# Patient Record
Sex: FEMALE | Race: BLACK OR AFRICAN AMERICAN | Employment: FULL TIME | URBAN - METROPOLITAN AREA
[De-identification: names, ages, dates, MRNs, and addresses within clinical notes are randomized per-mention and may not be internally consistent; named-entity substitution may affect disease eponyms.]

---

## 2018-08-31 ENCOUNTER — OCC HEALTH (OUTPATIENT)
Dept: OCCUPATIONAL MEDICINE | Age: 33
End: 2018-08-31
Attending: PHYSICIAN ASSISTANT

## 2018-09-04 ENCOUNTER — OCC HEALTH (OUTPATIENT)
Dept: OCCUPATIONAL MEDICINE | Age: 33
End: 2018-09-04
Attending: FAMILY MEDICINE

## 2018-09-06 ENCOUNTER — OFFICE VISIT (OUTPATIENT)
Dept: OCCUPATIONAL MEDICINE | Age: 33
End: 2018-09-06
Attending: PHYSICIAN ASSISTANT

## 2018-09-11 ENCOUNTER — OFFICE VISIT (OUTPATIENT)
Dept: OCCUPATIONAL MEDICINE | Age: 33
End: 2018-09-11
Attending: PHYSICIAN ASSISTANT

## 2018-09-13 ENCOUNTER — OFFICE VISIT (OUTPATIENT)
Dept: OCCUPATIONAL MEDICINE | Age: 33
End: 2018-09-13
Attending: PHYSICIAN ASSISTANT

## 2021-07-22 ENCOUNTER — LAB ENCOUNTER (OUTPATIENT)
Dept: LAB | Age: 36
End: 2021-07-22
Attending: INTERNAL MEDICINE
Payer: MEDICAID

## 2021-07-22 ENCOUNTER — TELEPHONE (OUTPATIENT)
Dept: INTERNAL MEDICINE CLINIC | Facility: CLINIC | Age: 36
End: 2021-07-22

## 2021-07-22 ENCOUNTER — OFFICE VISIT (OUTPATIENT)
Dept: INTERNAL MEDICINE CLINIC | Facility: CLINIC | Age: 36
End: 2021-07-22
Payer: MEDICAID

## 2021-07-22 ENCOUNTER — PATIENT MESSAGE (OUTPATIENT)
Dept: INTERNAL MEDICINE CLINIC | Facility: CLINIC | Age: 36
End: 2021-07-22

## 2021-07-22 VITALS
WEIGHT: 247 LBS | OXYGEN SATURATION: 98 % | HEART RATE: 86 BPM | TEMPERATURE: 99 F | SYSTOLIC BLOOD PRESSURE: 146 MMHG | BODY MASS INDEX: 41.15 KG/M2 | HEIGHT: 65 IN | RESPIRATION RATE: 16 BRPM | DIASTOLIC BLOOD PRESSURE: 96 MMHG

## 2021-07-22 DIAGNOSIS — Z00.00 LABORATORY EXAM ORDERED AS PART OF ROUTINE GENERAL MEDICAL EXAMINATION: ICD-10-CM

## 2021-07-22 DIAGNOSIS — Z86.018 HISTORY OF UTERINE FIBROID: ICD-10-CM

## 2021-07-22 DIAGNOSIS — R92.8 ABNORMAL MAMMOGRAM: ICD-10-CM

## 2021-07-22 DIAGNOSIS — Z00.00 ROUTINE GENERAL MEDICAL EXAMINATION AT A HEALTH CARE FACILITY: Primary | ICD-10-CM

## 2021-07-22 DIAGNOSIS — D64.9 ANEMIA, UNSPECIFIED TYPE: ICD-10-CM

## 2021-07-22 DIAGNOSIS — D64.9 ANEMIA, UNSPECIFIED TYPE: Primary | ICD-10-CM

## 2021-07-22 DIAGNOSIS — Z87.42 HISTORY OF OVARIAN CYST: ICD-10-CM

## 2021-07-22 DIAGNOSIS — R21 RASH AND NONSPECIFIC SKIN ERUPTION: ICD-10-CM

## 2021-07-22 DIAGNOSIS — R92.8 ABNORMAL MAMMOGRAM: Primary | ICD-10-CM

## 2021-07-22 LAB
ALBUMIN SERPL-MCNC: 3.6 G/DL (ref 3.4–5)
ALBUMIN/GLOB SERPL: 0.9 {RATIO} (ref 1–2)
ALP LIVER SERPL-CCNC: 74 U/L
ALT SERPL-CCNC: 16 U/L
ANION GAP SERPL CALC-SCNC: 6 MMOL/L (ref 0–18)
AST SERPL-CCNC: 14 U/L (ref 15–37)
BASOPHILS # BLD AUTO: 0.02 X10(3) UL (ref 0–0.2)
BASOPHILS NFR BLD AUTO: 0.4 %
BILIRUB SERPL-MCNC: 0.3 MG/DL (ref 0.1–2)
BUN BLD-MCNC: 9 MG/DL (ref 7–18)
BUN/CREAT SERPL: 9.2 (ref 10–20)
CALCIUM BLD-MCNC: 8.4 MG/DL (ref 8.5–10.1)
CHLORIDE SERPL-SCNC: 108 MMOL/L (ref 98–112)
CHOLEST SMN-MCNC: 143 MG/DL (ref ?–200)
CO2 SERPL-SCNC: 23 MMOL/L (ref 21–32)
CREAT BLD-MCNC: 0.98 MG/DL
DEPRECATED HBV CORE AB SER IA-ACNC: 24.5 NG/ML
DEPRECATED RDW RBC AUTO: 51.2 FL (ref 35.1–46.3)
EOSINOPHIL # BLD AUTO: 0.23 X10(3) UL (ref 0–0.7)
EOSINOPHIL NFR BLD AUTO: 4.9 %
ERYTHROCYTE [DISTWIDTH] IN BLOOD BY AUTOMATED COUNT: 16.4 % (ref 11–15)
GLOBULIN PLAS-MCNC: 4 G/DL (ref 2.8–4.4)
GLUCOSE BLD-MCNC: 80 MG/DL (ref 70–99)
HCT VFR BLD AUTO: 38.4 %
HDLC SERPL-MCNC: 48 MG/DL (ref 40–59)
HGB BLD-MCNC: 11.7 G/DL
IMM GRANULOCYTES # BLD AUTO: 0.01 X10(3) UL (ref 0–1)
IMM GRANULOCYTES NFR BLD: 0.2 %
IRON SATURATION: 10 %
IRON SERPL-MCNC: 43 UG/DL
LDLC SERPL CALC-MCNC: 82 MG/DL (ref ?–100)
LYMPHOCYTES # BLD AUTO: 1.8 X10(3) UL (ref 1–4)
LYMPHOCYTES NFR BLD AUTO: 38.5 %
M PROTEIN MFR SERPL ELPH: 7.6 G/DL (ref 6.4–8.2)
MCH RBC QN AUTO: 26.2 PG (ref 26–34)
MCHC RBC AUTO-ENTMCNC: 30.5 G/DL (ref 31–37)
MCV RBC AUTO: 86.1 FL
MONOCYTES # BLD AUTO: 0.44 X10(3) UL (ref 0.1–1)
MONOCYTES NFR BLD AUTO: 9.4 %
NEUTROPHILS # BLD AUTO: 2.17 X10 (3) UL (ref 1.5–7.7)
NEUTROPHILS # BLD AUTO: 2.17 X10(3) UL (ref 1.5–7.7)
NEUTROPHILS NFR BLD AUTO: 46.6 %
NONHDLC SERPL-MCNC: 95 MG/DL (ref ?–130)
OSMOLALITY SERPL CALC.SUM OF ELEC: 282 MOSM/KG (ref 275–295)
PATIENT FASTING Y/N/NP: YES
PATIENT FASTING Y/N/NP: YES
PLATELET # BLD AUTO: 295 10(3)UL (ref 150–450)
POTASSIUM SERPL-SCNC: 4.6 MMOL/L (ref 3.5–5.1)
RBC # BLD AUTO: 4.46 X10(6)UL
SODIUM SERPL-SCNC: 137 MMOL/L (ref 136–145)
TOTAL IRON BINDING CAPACITY: 451 UG/DL (ref 240–450)
TRANSFERRIN SERPL-MCNC: 303 MG/DL (ref 200–360)
TRIGL SERPL-MCNC: 66 MG/DL (ref 30–149)
TSI SER-ACNC: 2.07 MIU/ML (ref 0.36–3.74)
VIT B12 SERPL-MCNC: 589 PG/ML (ref 193–986)
VLDLC SERPL CALC-MCNC: 10 MG/DL (ref 0–30)
WBC # BLD AUTO: 4.7 X10(3) UL (ref 4–11)

## 2021-07-22 PROCEDURE — 83550 IRON BINDING TEST: CPT

## 2021-07-22 PROCEDURE — 82728 ASSAY OF FERRITIN: CPT

## 2021-07-22 PROCEDURE — 80061 LIPID PANEL: CPT

## 2021-07-22 PROCEDURE — 3080F DIAST BP >= 90 MM HG: CPT | Performed by: INTERNAL MEDICINE

## 2021-07-22 PROCEDURE — 3008F BODY MASS INDEX DOCD: CPT | Performed by: INTERNAL MEDICINE

## 2021-07-22 PROCEDURE — 3077F SYST BP >= 140 MM HG: CPT | Performed by: INTERNAL MEDICINE

## 2021-07-22 PROCEDURE — 84443 ASSAY THYROID STIM HORMONE: CPT

## 2021-07-22 PROCEDURE — 36415 COLL VENOUS BLD VENIPUNCTURE: CPT

## 2021-07-22 PROCEDURE — 82607 VITAMIN B-12: CPT

## 2021-07-22 PROCEDURE — 80053 COMPREHEN METABOLIC PANEL: CPT

## 2021-07-22 PROCEDURE — 85025 COMPLETE CBC W/AUTO DIFF WBC: CPT

## 2021-07-22 PROCEDURE — 83540 ASSAY OF IRON: CPT

## 2021-07-22 PROCEDURE — 99385 PREV VISIT NEW AGE 18-39: CPT | Performed by: INTERNAL MEDICINE

## 2021-07-22 NOTE — TELEPHONE ENCOUNTER
From: Hollie Shelley  To: Zonia Torres MD  Sent: 7/22/2021 3:35 PM CDT  Subject: Test Results Question    Good Afternoon,     I wanted to know if my test results will come back today or will it take a few days for the results?

## 2021-07-22 NOTE — PATIENT INSTRUCTIONS
Blood work    Call to schedule appointment for breast ultrasound      1. Start the day with protein - egg, cottage cheese, string cheese, greek yogurt, peanut butter on whole grain english muffin    2. Cut out juice/soda/alcohol.     3. Increase the number

## 2021-07-22 NOTE — TELEPHONE ENCOUNTER
: Test Results Question  7/22/2021 5:08 PM     To: Antoni Form      From: Dhara Adams CMA      Created: 7/22/2021 5:08 PM        Good evening Kandy Noriega,      It looks like some labs are still in process, meaning we are still waiting on the results.

## 2021-07-22 NOTE — TELEPHONE ENCOUNTER
Spoke with Saira Zaidi in central scheduling who spoke with mammography and states that patient will require a bilateral diagnostic mammogram.     Order pending if appropriate.

## 2021-07-22 NOTE — PROGRESS NOTES
[facility] Internal Medicine Office Note  Chief Complaint:   Patient presents with:  Physical  Breast Pain      HPI:   This is a 39year old female coming in for establishing care and physical and breast pain   HPI    Breast pain  She was found t 247 lb (112 kg)   LMP 07/04/2021   SpO2 98%   BMI 41.10 kg/m²  Estimated body mass index is 41.1 kg/m² as calculated from the following:    Height as of this encounter: 5' 5\" (1.651 m). Weight as of this encounter: 247 lb (112 kg).    Vital signs review Laboratory exam ordered as part of routine general medical examination  -     CBC WITH DIFFERENTIAL WITH PLATELET; Future  -     COMP METABOLIC PANEL (14); Future  -     LIPID PANEL;  Future  -     TSH W REFLEX TO FREE T4; Future    Abnormal mammogram - verbalizes understanding. Patient is notified to call with any questions, complications, allergies, or worsening or changing symptoms. Patient is to call with any side effects or complications from the treatments as a result of today.      Trent Cage MD

## 2021-07-22 NOTE — TELEPHONE ENCOUNTER
Kosta Orlando from central scheduling is calling to have the mammogram order changed to a diagnostic mammogram of both breast.

## 2021-07-23 NOTE — TELEPHONE ENCOUNTER
Faxed signed ROR to the [facility] to obtain patients  last pap and mammogram records   Fax number [phone]   Fax confirmation received   Sent to scan and copy placed in accordion     Awaiting records

## 2021-07-30 NOTE — TELEPHONE ENCOUNTER
Please call patient that we received her pap smear and mammogram records. Her last pap smear was in 3/2018 so she is due.  Recommend follow up with gynecology (Dr. Jg Saeed info previously given)    Bilateral diagnostic mammogram recommended which I see is

## 2021-07-30 NOTE — TELEPHONE ENCOUNTER
Received records from [facility] in [place]. Pap smear normal 3/15/2018    Mammogram 10/1/2020: diagnostic mammogram with possible ultrasound of the left in 6 months. Right breast pain - bilateral diagnostic recommended.   Ultrasound results: targeted lef

## 2021-07-30 NOTE — TELEPHONE ENCOUNTER
Patient provided with results and MD instructions listed below. Pt verbalized understanding. She is looking for copy of mammogram results. I informed pt that I will place a copy at the  for . Mammogram results placed at .

## 2021-09-08 ENCOUNTER — OFFICE VISIT (OUTPATIENT)
Dept: INTERNAL MEDICINE CLINIC | Facility: CLINIC | Age: 36
End: 2021-09-08
Payer: MEDICAID

## 2021-09-08 VITALS
SYSTOLIC BLOOD PRESSURE: 126 MMHG | BODY MASS INDEX: 40.98 KG/M2 | DIASTOLIC BLOOD PRESSURE: 80 MMHG | TEMPERATURE: 99 F | RESPIRATION RATE: 12 BRPM | WEIGHT: 246 LBS | HEART RATE: 84 BPM | HEIGHT: 65 IN

## 2021-09-08 DIAGNOSIS — Z51.81 ENCOUNTER FOR MEDICATION MONITORING: Primary | ICD-10-CM

## 2021-09-08 DIAGNOSIS — R21 RASH AND NONSPECIFIC SKIN ERUPTION: ICD-10-CM

## 2021-09-08 PROCEDURE — 3079F DIAST BP 80-89 MM HG: CPT | Performed by: INTERNAL MEDICINE

## 2021-09-08 PROCEDURE — 99214 OFFICE O/P EST MOD 30 MIN: CPT | Performed by: INTERNAL MEDICINE

## 2021-09-08 PROCEDURE — 93000 ELECTROCARDIOGRAM COMPLETE: CPT | Performed by: INTERNAL MEDICINE

## 2021-09-08 PROCEDURE — 3074F SYST BP LT 130 MM HG: CPT | Performed by: INTERNAL MEDICINE

## 2021-09-08 PROCEDURE — 3008F BODY MASS INDEX DOCD: CPT | Performed by: INTERNAL MEDICINE

## 2021-09-08 RX ORDER — PHENTERMINE HYDROCHLORIDE 37.5 MG/1
37.5 TABLET ORAL
Qty: 30 TABLET | Refills: 0 | Status: SHIPPED | OUTPATIENT
Start: 2021-09-08

## 2021-09-08 NOTE — PROGRESS NOTES
[facility] Internal Medicine Office Note  Chief Complaint:   Patient presents with:  Blood Pressure      HPI:   This is a 39year old female coming in for follow up   HPI     Cream on her back helped but coloring is different and she wants to see Genitourinary: Negative for dysuria. Neurological: Negative. Hematological: Negative. Psychiatric/Behavioral: Negative. EXAM:   /80 (BP Location: Right arm, Patient Position: Sitting, Cuff Size: adult)   Pulse 84   Temp 98.5 °F (36. 1 tablet (37.5 mg total) by mouth every morning before breakfast.    Meds & Refills for this Visit:  Requested Prescriptions     Signed Prescriptions Disp Refills   • Phentermine HCl 37.5 MG Oral Tab 30 tablet 0     Sig: Take 1 tablet (37.5 mg total) by mo

## 2021-09-08 NOTE — PATIENT INSTRUCTIONS
Start 1/2 tablet of phentermine in the morning. Can cut with knife or get pill cutter at the pharmacy      Dermatology [facility] dermatology  [phone] .  Call to see if they take your insurance and let me know who you will be seeing for appointment so I

## 2021-09-09 ENCOUNTER — PATIENT MESSAGE (OUTPATIENT)
Dept: INTERNAL MEDICINE CLINIC | Facility: CLINIC | Age: 36
End: 2021-09-09

## 2021-09-09 NOTE — TELEPHONE ENCOUNTER
From: Morgan Cid  To: Sheree Hess MD  Sent: 9/9/2021 11:50 AM CDT  Subject: Prescription Question    Are there any restrictions while taking this weight loss medication?

## 2021-09-25 ENCOUNTER — TELEPHONE (OUTPATIENT)
Dept: INTERNAL MEDICINE CLINIC | Facility: CLINIC | Age: 36
End: 2021-09-25

## 2021-09-25 LAB — PREGNANCY TEST, URINE: NEGATIVE

## 2021-09-25 NOTE — TELEPHONE ENCOUNTER
Incoming fax from [facility] with ER records from 9.25.21   pregnancy test taken   Results entered into [facility]   Placed in  in-basket for review

## 2022-06-27 ENCOUNTER — TELEMEDICINE (OUTPATIENT)
Dept: TELEHEALTH | Age: 37
End: 2022-06-27

## 2022-06-27 DIAGNOSIS — R51.9 HEADACHE, UNSPECIFIED HEADACHE TYPE: Primary | ICD-10-CM

## 2022-06-27 DIAGNOSIS — Z78.9 DIETING: ICD-10-CM

## 2022-07-14 RX ORDER — PHENTERMINE HYDROCHLORIDE 37.5 MG/1
TABLET ORAL
Qty: 30 TABLET | Refills: 0 | OUTPATIENT
Start: 2022-07-14

## 2022-09-24 ENCOUNTER — OFFICE VISIT (OUTPATIENT)
Dept: INTERNAL MEDICINE CLINIC | Facility: CLINIC | Age: 37
End: 2022-09-24

## 2022-09-24 ENCOUNTER — LAB ENCOUNTER (OUTPATIENT)
Dept: LAB | Age: 37
End: 2022-09-24
Attending: INTERNAL MEDICINE

## 2022-09-24 VITALS
DIASTOLIC BLOOD PRESSURE: 82 MMHG | RESPIRATION RATE: 16 BRPM | HEIGHT: 64 IN | TEMPERATURE: 99 F | OXYGEN SATURATION: 98 % | BODY MASS INDEX: 39.95 KG/M2 | SYSTOLIC BLOOD PRESSURE: 122 MMHG | HEART RATE: 82 BPM | WEIGHT: 234 LBS

## 2022-09-24 DIAGNOSIS — Z00.00 LABORATORY EXAM ORDERED AS PART OF ROUTINE GENERAL MEDICAL EXAMINATION: ICD-10-CM

## 2022-09-24 DIAGNOSIS — R21 RASH AND NONSPECIFIC SKIN ERUPTION: ICD-10-CM

## 2022-09-24 DIAGNOSIS — Z00.00 ENCOUNTER FOR ROUTINE ADULT MEDICAL EXAMINATION: Primary | ICD-10-CM

## 2022-09-24 DIAGNOSIS — E66.01 OBESITY, CLASS III, BMI 40-49.9 (MORBID OBESITY) (HCC): ICD-10-CM

## 2022-09-24 LAB
ALBUMIN SERPL-MCNC: 3.6 G/DL (ref 3.4–5)
ALBUMIN/GLOB SERPL: 0.9 {RATIO} (ref 1–2)
ALP LIVER SERPL-CCNC: 69 U/L
ALT SERPL-CCNC: 11 U/L
ANION GAP SERPL CALC-SCNC: 3 MMOL/L (ref 0–18)
AST SERPL-CCNC: 12 U/L (ref 15–37)
BASOPHILS # BLD AUTO: 0.01 X10(3) UL (ref 0–0.2)
BASOPHILS NFR BLD AUTO: 0.2 %
BILIRUB SERPL-MCNC: 0.7 MG/DL (ref 0.1–2)
BUN BLD-MCNC: 8 MG/DL (ref 7–18)
CALCIUM BLD-MCNC: 9.1 MG/DL (ref 8.5–10.1)
CHLORIDE SERPL-SCNC: 108 MMOL/L (ref 98–112)
CHOLEST SERPL-MCNC: 144 MG/DL (ref ?–200)
CO2 SERPL-SCNC: 27 MMOL/L (ref 21–32)
CREAT BLD-MCNC: 1.04 MG/DL
EOSINOPHIL # BLD AUTO: 0.1 X10(3) UL (ref 0–0.7)
EOSINOPHIL NFR BLD AUTO: 2.2 %
ERYTHROCYTE [DISTWIDTH] IN BLOOD BY AUTOMATED COUNT: 14.7 %
FASTING PATIENT LIPID ANSWER: YES
FASTING STATUS PATIENT QL REPORTED: YES
GFR SERPLBLD BASED ON 1.73 SQ M-ARVRAT: 71 ML/MIN/1.73M2 (ref 60–?)
GLOBULIN PLAS-MCNC: 4 G/DL (ref 2.8–4.4)
GLUCOSE BLD-MCNC: 90 MG/DL (ref 70–99)
HCT VFR BLD AUTO: 37 %
HDLC SERPL-MCNC: 43 MG/DL (ref 40–59)
HGB BLD-MCNC: 11.9 G/DL
IMM GRANULOCYTES # BLD AUTO: 0.01 X10(3) UL (ref 0–1)
IMM GRANULOCYTES NFR BLD: 0.2 %
LDLC SERPL CALC-MCNC: 86 MG/DL (ref ?–100)
LYMPHOCYTES # BLD AUTO: 1.91 X10(3) UL (ref 1–4)
LYMPHOCYTES NFR BLD AUTO: 42.9 %
MCH RBC QN AUTO: 27 PG (ref 26–34)
MCHC RBC AUTO-ENTMCNC: 32.2 G/DL (ref 31–37)
MCV RBC AUTO: 84.1 FL
MONOCYTES # BLD AUTO: 0.37 X10(3) UL (ref 0.1–1)
MONOCYTES NFR BLD AUTO: 8.3 %
NEUTROPHILS # BLD AUTO: 2.05 X10 (3) UL (ref 1.5–7.7)
NEUTROPHILS # BLD AUTO: 2.05 X10(3) UL (ref 1.5–7.7)
NEUTROPHILS NFR BLD AUTO: 46.2 %
NONHDLC SERPL-MCNC: 101 MG/DL (ref ?–130)
OSMOLALITY SERPL CALC.SUM OF ELEC: 284 MOSM/KG (ref 275–295)
PLATELET # BLD AUTO: 276 10(3)UL (ref 150–450)
POTASSIUM SERPL-SCNC: 4.1 MMOL/L (ref 3.5–5.1)
PROT SERPL-MCNC: 7.6 G/DL (ref 6.4–8.2)
RBC # BLD AUTO: 4.4 X10(6)UL
SODIUM SERPL-SCNC: 138 MMOL/L (ref 136–145)
TRIGL SERPL-MCNC: 79 MG/DL (ref 30–149)
TSI SER-ACNC: 2.27 MIU/ML (ref 0.36–3.74)
VLDLC SERPL CALC-MCNC: 13 MG/DL (ref 0–30)
WBC # BLD AUTO: 4.5 X10(3) UL (ref 4–11)

## 2022-09-24 PROCEDURE — 84443 ASSAY THYROID STIM HORMONE: CPT

## 2022-09-24 PROCEDURE — 80061 LIPID PANEL: CPT

## 2022-09-24 PROCEDURE — 36415 COLL VENOUS BLD VENIPUNCTURE: CPT

## 2022-09-24 PROCEDURE — 3008F BODY MASS INDEX DOCD: CPT | Performed by: INTERNAL MEDICINE

## 2022-09-24 PROCEDURE — 3074F SYST BP LT 130 MM HG: CPT | Performed by: INTERNAL MEDICINE

## 2022-09-24 PROCEDURE — 99395 PREV VISIT EST AGE 18-39: CPT | Performed by: INTERNAL MEDICINE

## 2022-09-24 PROCEDURE — 85025 COMPLETE CBC W/AUTO DIFF WBC: CPT

## 2022-09-24 PROCEDURE — 80053 COMPREHEN METABOLIC PANEL: CPT

## 2022-09-24 PROCEDURE — 3079F DIAST BP 80-89 MM HG: CPT | Performed by: INTERNAL MEDICINE

## 2022-09-24 RX ORDER — CLOTRIMAZOLE AND BETAMETHASONE DIPROPIONATE 10; .64 MG/G; MG/G
CREAM TOPICAL
Qty: 60 G | Refills: 0 | Status: SHIPPED | OUTPATIENT
Start: 2022-09-24

## 2022-09-24 RX ORDER — PHENTERMINE HYDROCHLORIDE 15 MG/1
15 CAPSULE ORAL EVERY MORNING
Qty: 30 CAPSULE | Refills: 0 | Status: SHIPPED | OUTPATIENT
Start: 2022-09-24

## 2022-09-26 ENCOUNTER — TELEPHONE (OUTPATIENT)
Dept: INTERNAL MEDICINE CLINIC | Facility: CLINIC | Age: 37
End: 2022-09-26

## 2022-09-26 RX ORDER — CLOTRIMAZOLE 1 %
CREAM (GRAM) TOPICAL
Qty: 45 G | Refills: 0 | Status: SHIPPED | OUTPATIENT
Start: 2022-09-26

## 2022-09-26 NOTE — TELEPHONE ENCOUNTER
Incoming fax from [facility]   Clotrimazole-betamethasone cream, 15gm is not covered under patients plan   Okay to proceed with PA?

## 2022-09-26 NOTE — TELEPHONE ENCOUNTER
Called pharmacy and they stated rx needs to be two separate prescriptions   Also betamethasone needs to be 0.01%

## 2022-10-20 ENCOUNTER — PATIENT MESSAGE (OUTPATIENT)
Dept: INTERNAL MEDICINE CLINIC | Facility: CLINIC | Age: 37
End: 2022-10-20

## 2022-10-21 ENCOUNTER — PATIENT MESSAGE (OUTPATIENT)
Dept: INTERNAL MEDICINE CLINIC | Facility: CLINIC | Age: 37
End: 2022-10-21

## 2022-10-21 RX ORDER — PHENTERMINE HYDROCHLORIDE 37.5 MG/1
37.5 TABLET ORAL
Qty: 30 TABLET | Refills: 0 | Status: SHIPPED | OUTPATIENT
Start: 2022-10-21

## 2022-10-21 NOTE — TELEPHONE ENCOUNTER
From: Ara Hudson  Sent: 10/21/2022 1:54 PM CDT  To: [facility] Clinical Staff  Subject: Prescription     Thanks. I will make a note of it in my calendar.

## 2022-10-21 NOTE — TELEPHONE ENCOUNTER
LS- please see previous note-patient not seeing response with lower phentermine dose    Last OV 9/24 (phentermine 15mg daily recommended x 30 days). 1 month follow-up appointment was recommended. Should patient increase dose and/or schedule follow-up visit to further discuss?   TY

## 2022-11-07 ENCOUNTER — PATIENT MESSAGE (OUTPATIENT)
Dept: INTERNAL MEDICINE CLINIC | Facility: CLINIC | Age: 37
End: 2022-11-07

## 2022-11-08 ENCOUNTER — PATIENT MESSAGE (OUTPATIENT)
Dept: INTERNAL MEDICINE CLINIC | Facility: CLINIC | Age: 37
End: 2022-11-08

## 2022-11-08 DIAGNOSIS — N64.4 BREAST PAIN, LEFT: Primary | ICD-10-CM

## 2022-11-09 NOTE — TELEPHONE ENCOUNTER
From: Elba Ramires  To: Gracy Gasca MD  Sent: 11/8/2022 1:38 PM CST  Subject: Mammogram     I wanted to know if you can send a request for a mammogram. I forgot to ask when I was there. I never went last year. I have been having pain in my left breast and wanted to get it checked out ASAP.

## 2022-11-14 ENCOUNTER — HOSPITAL ENCOUNTER (OUTPATIENT)
Age: 37
Discharge: HOME | End: 2022-11-14
Attending: EMERGENCY MEDICINE
Payer: MEDICAID

## 2022-11-14 VITALS
DIASTOLIC BLOOD PRESSURE: 87 MMHG | SYSTOLIC BLOOD PRESSURE: 141 MMHG | TEMPERATURE: 98 F | HEIGHT: 66 IN | WEIGHT: 229 LBS | BODY MASS INDEX: 36.8 KG/M2 | RESPIRATION RATE: 18 BRPM | OXYGEN SATURATION: 99 % | HEART RATE: 84 BPM

## 2022-11-14 DIAGNOSIS — R00.2 PALPITATIONS: Primary | ICD-10-CM

## 2022-11-14 LAB
#MXD IC: 0.3 X10ˆ3/UL (ref 0.1–1)
ATRIAL RATE: 75 BPM
BUN BLD-MCNC: 10 MG/DL (ref 7–18)
CHLORIDE BLD-SCNC: 103 MMOL/L (ref 98–112)
CO2 BLD-SCNC: 27 MMOL/L (ref 21–32)
CREAT BLD-MCNC: 1 MG/DL
GFR SERPLBLD BASED ON 1.73 SQ M-ARVRAT: 74 ML/MIN/1.73M2 (ref 60–?)
GLUCOSE BLD-MCNC: 81 MG/DL (ref 70–99)
HCT VFR BLD AUTO: 38.4 %
HCT VFR BLD CALC: 37 %
HGB BLD-MCNC: 11.8 G/DL
ISTAT IONIZED CALCIUM FOR CHEM 8: 1.26 MMOL/L (ref 1.12–1.32)
LYMPHOCYTES # BLD AUTO: 2 X10ˆ3/UL (ref 1–4)
LYMPHOCYTES NFR BLD AUTO: 36.9 %
MCH RBC QN AUTO: 25.8 PG (ref 26–34)
MCHC RBC AUTO-ENTMCNC: 30.7 G/DL (ref 31–37)
MCV RBC AUTO: 84 FL (ref 80–100)
MIXED CELL %: 6.3 %
NEUTROPHILS # BLD AUTO: 3 X10ˆ3/UL (ref 1.5–7.7)
NEUTROPHILS NFR BLD AUTO: 56.8 %
P AXIS: 60 DEGREES
P-R INTERVAL: 172 MS
PLATELET # BLD AUTO: 291 X10ˆ3/UL (ref 150–450)
POTASSIUM BLD-SCNC: 4.5 MMOL/L (ref 3.6–5.1)
Q-T INTERVAL: 380 MS
QRS DURATION: 82 MS
QTC CALCULATION (BEZET): 424 MS
R AXIS: 52 DEGREES
RBC # BLD AUTO: 4.57 X10ˆ6/UL
SODIUM BLD-SCNC: 139 MMOL/L (ref 136–145)
T AXIS: 56 DEGREES
VENTRICULAR RATE: 75 BPM
WBC # BLD AUTO: 5.3 X10ˆ3/UL (ref 4–11)

## 2022-11-14 PROCEDURE — 80047 BASIC METABLC PNL IONIZED CA: CPT

## 2022-11-14 PROCEDURE — 85025 COMPLETE CBC W/AUTO DIFF WBC: CPT | Performed by: EMERGENCY MEDICINE

## 2022-11-14 PROCEDURE — 99203 OFFICE O/P NEW LOW 30 MIN: CPT

## 2022-11-14 PROCEDURE — 93010 ELECTROCARDIOGRAM REPORT: CPT

## 2022-11-14 PROCEDURE — 36415 COLL VENOUS BLD VENIPUNCTURE: CPT

## 2022-11-14 PROCEDURE — 93005 ELECTROCARDIOGRAM TRACING: CPT

## 2022-11-14 PROCEDURE — 99214 OFFICE O/P EST MOD 30 MIN: CPT

## 2022-11-14 NOTE — ED INITIAL ASSESSMENT (HPI)
Pt with L arm numbness/tingling/nausea and dizziness and episode of heart racing this morning    No fever/cough/chest pain/sob

## 2022-11-16 ENCOUNTER — TELEMEDICINE (OUTPATIENT)
Dept: TELEHEALTH | Age: 37
End: 2022-11-16

## 2022-11-16 DIAGNOSIS — R59.0 PREAURICULAR LYMPHADENOPATHY: Primary | ICD-10-CM

## 2022-11-16 PROCEDURE — 99213 OFFICE O/P EST LOW 20 MIN: CPT | Performed by: NURSE PRACTITIONER

## 2022-11-29 ENCOUNTER — HOSPITAL ENCOUNTER (OUTPATIENT)
Dept: MAMMOGRAPHY | Facility: HOSPITAL | Age: 37
Discharge: HOME | End: 2022-11-29
Attending: INTERNAL MEDICINE
Payer: MEDICAID

## 2022-11-29 DIAGNOSIS — N64.4 BREAST PAIN, LEFT: ICD-10-CM

## 2022-11-29 DIAGNOSIS — R92.8 ABNORMAL MAMMOGRAM: Primary | ICD-10-CM

## 2022-11-29 PROCEDURE — 76642 ULTRASOUND BREAST LIMITED: CPT | Performed by: INTERNAL MEDICINE

## 2022-11-29 PROCEDURE — 77062 BREAST TOMOSYNTHESIS BI: CPT | Performed by: INTERNAL MEDICINE

## 2022-11-29 PROCEDURE — 77066 DX MAMMO INCL CAD BI: CPT | Performed by: INTERNAL MEDICINE

## 2023-02-06 ENCOUNTER — OFFICE VISIT (OUTPATIENT)
Dept: OBGYN CLINIC | Facility: CLINIC | Age: 38
End: 2023-02-06
Payer: MEDICAID

## 2023-02-06 VITALS
BODY MASS INDEX: 37 KG/M2 | SYSTOLIC BLOOD PRESSURE: 120 MMHG | HEART RATE: 79 BPM | DIASTOLIC BLOOD PRESSURE: 80 MMHG | WEIGHT: 228.5 LBS

## 2023-02-06 DIAGNOSIS — B96.89 BV (BACTERIAL VAGINOSIS): ICD-10-CM

## 2023-02-06 DIAGNOSIS — Z01.419 WELL WOMAN EXAM WITH ROUTINE GYNECOLOGICAL EXAM: Primary | ICD-10-CM

## 2023-02-06 DIAGNOSIS — N76.0 BV (BACTERIAL VAGINOSIS): ICD-10-CM

## 2023-02-06 DIAGNOSIS — Z11.3 SCREEN FOR STD (SEXUALLY TRANSMITTED DISEASE): ICD-10-CM

## 2023-02-06 DIAGNOSIS — Z12.4 CERVICAL CANCER SCREENING: ICD-10-CM

## 2023-02-06 DIAGNOSIS — Z86.018 HISTORY OF UTERINE FIBROID: ICD-10-CM

## 2023-02-06 PROCEDURE — 87591 N.GONORRHOEAE DNA AMP PROB: CPT | Performed by: NURSE PRACTITIONER

## 2023-02-06 PROCEDURE — 87491 CHLMYD TRACH DNA AMP PROBE: CPT | Performed by: NURSE PRACTITIONER

## 2023-02-06 RX ORDER — METRONIDAZOLE 500 MG/1
500 TABLET ORAL 2 TIMES DAILY WITH MEALS
Qty: 14 TABLET | Refills: 1 | Status: SHIPPED | OUTPATIENT
Start: 2023-02-06 | End: 2023-02-13

## 2023-02-07 LAB
C TRACH DNA SPEC QL NAA+PROBE: NEGATIVE
N GONORRHOEA DNA SPEC QL NAA+PROBE: NEGATIVE

## 2023-02-15 LAB — HPV I/H RISK 1 DNA SPEC QL NAA+PROBE: NEGATIVE

## 2023-02-20 ENCOUNTER — TELEPHONE (OUTPATIENT)
Dept: INTERNAL MEDICINE CLINIC | Facility: CLINIC | Age: 38
End: 2023-02-20

## 2023-02-20 ENCOUNTER — OFFICE VISIT (OUTPATIENT)
Dept: INTERNAL MEDICINE CLINIC | Facility: CLINIC | Age: 38
End: 2023-02-20
Payer: MEDICAID

## 2023-02-20 VITALS
HEART RATE: 78 BPM | RESPIRATION RATE: 16 BRPM | SYSTOLIC BLOOD PRESSURE: 110 MMHG | HEIGHT: 66 IN | TEMPERATURE: 99 F | DIASTOLIC BLOOD PRESSURE: 74 MMHG | OXYGEN SATURATION: 99 % | WEIGHT: 226.38 LBS | BODY MASS INDEX: 36.38 KG/M2

## 2023-02-20 DIAGNOSIS — E66.9 OBESITY (BMI 35.0-39.9 WITHOUT COMORBIDITY): ICD-10-CM

## 2023-02-20 DIAGNOSIS — R21 RASH AND NONSPECIFIC SKIN ERUPTION: Primary | ICD-10-CM

## 2023-02-20 DIAGNOSIS — Z51.81 ENCOUNTER FOR MEDICATION MONITORING: ICD-10-CM

## 2023-02-20 PROCEDURE — 3078F DIAST BP <80 MM HG: CPT | Performed by: INTERNAL MEDICINE

## 2023-02-20 PROCEDURE — 3074F SYST BP LT 130 MM HG: CPT | Performed by: INTERNAL MEDICINE

## 2023-02-20 PROCEDURE — 3008F BODY MASS INDEX DOCD: CPT | Performed by: INTERNAL MEDICINE

## 2023-02-20 PROCEDURE — 99213 OFFICE O/P EST LOW 20 MIN: CPT | Performed by: INTERNAL MEDICINE

## 2023-02-20 RX ORDER — PEN NEEDLE, DIABETIC 30 GX3/16"
1 NEEDLE, DISPOSABLE MISCELLANEOUS DAILY
Qty: 90 EACH | Refills: 3 | Status: SHIPPED | OUTPATIENT
Start: 2023-02-20 | End: 2023-05-21

## 2023-02-20 RX ORDER — LIRAGLUTIDE 6 MG/ML
INJECTION, SOLUTION SUBCUTANEOUS
Qty: 3 ML | Refills: 0 | Status: SHIPPED | OUTPATIENT
Start: 2023-02-20 | End: 2023-04-19

## 2023-02-20 NOTE — PATIENT INSTRUCTIONS
Saxenda dosing  Week 1: 0.6mg once a day  Week 2: 1.2mg once a day  Week 3: 1.8mg once a day  Week 4: 2.4mg once a day  Week 5 and onward: 3mg once a day    [email or web address]. com    Call to schedule nurse visit appointment after medication is obtained and bring with to pharmacy

## 2023-02-20 NOTE — TELEPHONE ENCOUNTER
Incoming fax from [facility] required for Saxenda   Processed through [address] response

## 2023-02-21 ENCOUNTER — PATIENT MESSAGE (OUTPATIENT)
Dept: INTERNAL MEDICINE CLINIC | Facility: CLINIC | Age: 38
End: 2023-02-21

## 2023-02-21 NOTE — TELEPHONE ENCOUNTER
From: Leslie Felton  To: Jesika Benton MD  Sent: 2/21/2023 11:15 AM CST  Subject: Prescription     I called my insurance company to see if they will cover my prescription they said they will send some paperwork over to your office and then you can send it over to the pharmacy. Have you received any paperwork?

## 2023-02-21 NOTE — TELEPHONE ENCOUNTER
Incoming fax from [employer]   PA denied   rx not covered under patients pharmacy benefit     Please advise

## 2023-02-22 NOTE — TELEPHONE ENCOUNTER
From: Leslie Felton  To: Jesika Benton MD  Sent: 2/21/2023 12:41 PM CST  Subject: Prescription     I talked to my insurance company and they denied the request. I was hoping that I can get a savings card if you have one.  Thanks

## 2023-02-23 NOTE — TELEPHONE ENCOUNTER
Please let patient know that the Saxenda prescription was not covered by her insurance. It is noted that her insurance plan does not cover weight loss medications. An alternative would be prescribing the two components of Contrave weight loss medication of bupropion and naltrexone that we had discussed briefly at appointment. It is considered \"off label\" prescribing because the generics do not exactly replicate the Contrave brand name dosing. As we discussed, the side effects can include irritability, insomnia, headache, and constipation.      She can think about it and let me know or schedule appointment to further discuss

## 2023-02-24 NOTE — TELEPHONE ENCOUNTER
Patient considering paying for saxenda out of pocket. Will also consider contrave alternative. Will schedule appt with Dr. Antonietta White to discuss further if she decides against Saxenda.

## 2023-02-27 ENCOUNTER — PATIENT MESSAGE (OUTPATIENT)
Dept: INTERNAL MEDICINE CLINIC | Facility: CLINIC | Age: 38
End: 2023-02-27

## 2023-02-27 DIAGNOSIS — L65.9 HAIR LOSS: ICD-10-CM

## 2023-02-27 DIAGNOSIS — R21 RASH AND NONSPECIFIC SKIN ERUPTION: Primary | ICD-10-CM

## 2023-02-27 NOTE — TELEPHONE ENCOUNTER
From: Nichole Saravia  To: Jesusita Roe MD  Sent: 2/27/2023 10:55 AM CST  Subject: Dermatologist     When I came in last week about my hair lost. I would feel more comfortable if I saw a dermatologist for my hair lost because my hair keeps falling out and I have sores in my head. Will you be able to send me a referral for one?

## 2023-02-28 NOTE — TELEPHONE ENCOUNTER
Please give her info for Dr Brett Wallace who should be in network for her    (Dr. Godfrey Points does not take medicaid as far as I know)

## 2023-02-28 NOTE — TELEPHONE ENCOUNTER
LS - from pt's ID card web site, pended new referral to [facility], please sign if appropriate, ty!

## 2023-03-03 NOTE — TELEPHONE ENCOUNTER
Dermatology referral to Dr. Deep Tom is authorized. Faxed all referral documents to Dr. Deep Tom at fax #[phone] and received confirmation page. [facility] sent to the pt.

## 2023-05-24 ENCOUNTER — HOSPITAL ENCOUNTER (OUTPATIENT)
Dept: MAMMOGRAPHY | Facility: HOSPITAL | Age: 38
Discharge: HOME | End: 2023-05-24
Attending: INTERNAL MEDICINE
Payer: MEDICAID

## 2023-05-24 ENCOUNTER — ANCILLARY ORDERS (OUTPATIENT)
Dept: INTERNAL MEDICINE CLINIC | Facility: CLINIC | Age: 38
End: 2023-05-24

## 2023-05-24 DIAGNOSIS — R92.8 ABNORMAL MAMMOGRAM: ICD-10-CM

## 2023-05-24 DIAGNOSIS — Z12.31 SCREENING MAMMOGRAM FOR BREAST CANCER: Primary | ICD-10-CM

## 2023-05-24 PROCEDURE — 76642 ULTRASOUND BREAST LIMITED: CPT | Performed by: INTERNAL MEDICINE

## 2023-06-04 ENCOUNTER — PATIENT MESSAGE (OUTPATIENT)
Dept: INTERNAL MEDICINE CLINIC | Facility: CLINIC | Age: 38
End: 2023-06-04

## 2023-06-04 DIAGNOSIS — Z01.82 ENCOUNTER FOR ALLERGY TESTING: Primary | ICD-10-CM

## 2023-08-19 ENCOUNTER — HOSPITAL ENCOUNTER (OUTPATIENT)
Dept: ULTRASOUND IMAGING | Age: 38
Discharge: HOME | End: 2023-08-19
Attending: NURSE PRACTITIONER
Payer: MEDICAID

## 2023-08-19 DIAGNOSIS — Z86.018 HISTORY OF UTERINE FIBROID: ICD-10-CM

## 2023-08-19 PROCEDURE — 76856 US EXAM PELVIC COMPLETE: CPT | Performed by: NURSE PRACTITIONER

## 2023-08-19 PROCEDURE — 76830 TRANSVAGINAL US NON-OB: CPT | Performed by: NURSE PRACTITIONER

## 2023-08-21 ENCOUNTER — PATIENT MESSAGE (OUTPATIENT)
Dept: OBGYN CLINIC | Facility: CLINIC | Age: 38
End: 2023-08-21

## 2023-08-22 NOTE — TELEPHONE ENCOUNTER
From: James Fisher  To: DEIDRA Giordano  Sent: 8/21/2023 5:49 PM CDT  Subject: Results     I wanted to ask do I have PCOS?

## 2023-10-04 DIAGNOSIS — R21 RASH AND NONSPECIFIC SKIN ERUPTION: ICD-10-CM

## 2023-10-05 NOTE — TELEPHONE ENCOUNTER
LOV: 2/20/2023 with Dr. Letty Dunaway  RTC: 5 weeks  Last Relevant Labs: 9/24/2022  Filled: 2/20/2023    #20 g with 0 refills    Future Appointments   Date Time Provider Kameron Chung   12/1/2023 11:20 AM [facility] RM1 [address] [facility]

## 2023-12-14 DIAGNOSIS — R21 RASH AND NONSPECIFIC SKIN ERUPTION: ICD-10-CM

## 2023-12-15 NOTE — TELEPHONE ENCOUNTER
No protocol     Hydrocortisone 2.5%     LOV: 2/20/23   RTC: 5 weeks   Filled: 10/6/23 # 20g   Future Appointments   Date Time Provider Kameron Chung   1/26/2024 11:40 AM [facility] RM3 [address] [facility]

## 2024-01-12 ENCOUNTER — PATIENT MESSAGE (OUTPATIENT)
Dept: INTERNAL MEDICINE CLINIC | Facility: CLINIC | Age: 39
End: 2024-01-12

## 2024-01-15 NOTE — TELEPHONE ENCOUNTER
From: Kat Wolf  To: Lori Palacios  Sent: 1/12/2024 1:15 PM CST  Subject: Menstrual     Good Afternoon   I haven’t received a period since 11/30 with very bad abdominal pain. Went to the hospital not pregnant had blood drawn but they can’t see what was wrong with me. What could be the cause of missed period besides pregnancy. Could I be going through premenopausal symptoms?

## 2024-01-26 ENCOUNTER — HOSPITAL ENCOUNTER (OUTPATIENT)
Dept: MAMMOGRAPHY | Facility: HOSPITAL | Age: 39
Discharge: HOME | End: 2024-01-26
Attending: INTERNAL MEDICINE
Payer: MEDICAID

## 2024-01-26 DIAGNOSIS — Z12.31 SCREENING MAMMOGRAM FOR BREAST CANCER: ICD-10-CM

## 2024-01-26 PROCEDURE — 77067 SCR MAMMO BI INCL CAD: CPT | Performed by: INTERNAL MEDICINE

## 2024-01-26 PROCEDURE — 77063 BREAST TOMOSYNTHESIS BI: CPT | Performed by: INTERNAL MEDICINE

## 2024-01-29 ENCOUNTER — PATIENT MESSAGE (OUTPATIENT)
Dept: INTERNAL MEDICINE CLINIC | Facility: CLINIC | Age: 39
End: 2024-01-29

## 2024-01-29 DIAGNOSIS — R92.30 DENSE BREASTS: Primary | ICD-10-CM

## 2024-01-30 NOTE — TELEPHONE ENCOUNTER
From: Kat Wolf  To: Lori Palacios  Sent: 1/29/2024 11:14 AM CST  Subject: Ultrasound     Good Morning you can place the ultrasound. I will be in the office on Saturday 2/3 will I be able to get the ultrasound the same day?

## 2024-01-30 NOTE — TELEPHONE ENCOUNTER
LS - pt requests the bilateral breast US. Order pended, ty!    MCM sent to pt explaining this test cannot be done at the time of her appointment 2/3/24.

## 2024-02-03 ENCOUNTER — OFFICE VISIT (OUTPATIENT)
Dept: INTERNAL MEDICINE CLINIC | Facility: CLINIC | Age: 39
End: 2024-02-03
Payer: MEDICAID

## 2024-02-03 ENCOUNTER — LAB ENCOUNTER (OUTPATIENT)
Dept: LAB | Age: 39
End: 2024-02-03
Attending: INTERNAL MEDICINE
Payer: MEDICAID

## 2024-02-03 VITALS
HEIGHT: 66 IN | OXYGEN SATURATION: 97 % | HEART RATE: 84 BPM | WEIGHT: 212.38 LBS | RESPIRATION RATE: 16 BRPM | DIASTOLIC BLOOD PRESSURE: 82 MMHG | SYSTOLIC BLOOD PRESSURE: 120 MMHG | TEMPERATURE: 98 F | BODY MASS INDEX: 34.13 KG/M2

## 2024-02-03 DIAGNOSIS — Z00.00 LABORATORY EXAM ORDERED AS PART OF ROUTINE GENERAL MEDICAL EXAMINATION: ICD-10-CM

## 2024-02-03 DIAGNOSIS — Z00.00 ENCOUNTER FOR ROUTINE ADULT MEDICAL EXAMINATION: Primary | ICD-10-CM

## 2024-02-03 DIAGNOSIS — K12.0 APHTHOUS ULCER: ICD-10-CM

## 2024-02-03 DIAGNOSIS — R21 RASH: ICD-10-CM

## 2024-02-03 LAB
ALBUMIN SERPL-MCNC: 3.9 G/DL (ref 3.4–5)
ALBUMIN/GLOB SERPL: 1 {RATIO} (ref 1–2)
ALP LIVER SERPL-CCNC: 66 U/L
ALT SERPL-CCNC: 9 U/L
ANION GAP SERPL CALC-SCNC: 3 MMOL/L (ref 0–18)
AST SERPL-CCNC: 7 U/L (ref 15–37)
BASOPHILS # BLD AUTO: 0.02 X10(3) UL (ref 0–0.2)
BASOPHILS NFR BLD AUTO: 0.5 %
BILIRUB SERPL-MCNC: 0.6 MG/DL (ref 0.1–2)
BUN BLD-MCNC: 9 MG/DL (ref 9–23)
CALCIUM BLD-MCNC: 9 MG/DL (ref 8.5–10.1)
CHLORIDE SERPL-SCNC: 110 MMOL/L (ref 98–112)
CHOLEST SERPL-MCNC: 168 MG/DL (ref ?–200)
CO2 SERPL-SCNC: 27 MMOL/L (ref 21–32)
CREAT BLD-MCNC: 1.01 MG/DL
EGFRCR SERPLBLD CKD-EPI 2021: 73 ML/MIN/1.73M2 (ref 60–?)
EOSINOPHIL # BLD AUTO: 0.08 X10(3) UL (ref 0–0.7)
EOSINOPHIL NFR BLD AUTO: 2 %
ERYTHROCYTE [DISTWIDTH] IN BLOOD BY AUTOMATED COUNT: 14.7 %
FASTING PATIENT LIPID ANSWER: YES
FASTING STATUS PATIENT QL REPORTED: YES
GLOBULIN PLAS-MCNC: 3.8 G/DL (ref 2.8–4.4)
GLUCOSE BLD-MCNC: 80 MG/DL (ref 70–99)
HCT VFR BLD AUTO: 37.8 %
HDLC SERPL-MCNC: 50 MG/DL (ref 40–59)
HGB BLD-MCNC: 12.4 G/DL
IMM GRANULOCYTES # BLD AUTO: 0.01 X10(3) UL (ref 0–1)
IMM GRANULOCYTES NFR BLD: 0.2 %
LDLC SERPL CALC-MCNC: 108 MG/DL (ref ?–100)
LYMPHOCYTES # BLD AUTO: 1.62 X10(3) UL (ref 1–4)
LYMPHOCYTES NFR BLD AUTO: 39.5 %
MCH RBC QN AUTO: 27.1 PG (ref 26–34)
MCHC RBC AUTO-ENTMCNC: 32.8 G/DL (ref 31–37)
MCV RBC AUTO: 82.5 FL
MONOCYTES # BLD AUTO: 0.28 X10(3) UL (ref 0.1–1)
MONOCYTES NFR BLD AUTO: 6.8 %
NEUTROPHILS # BLD AUTO: 2.09 X10 (3) UL (ref 1.5–7.7)
NEUTROPHILS # BLD AUTO: 2.09 X10(3) UL (ref 1.5–7.7)
NEUTROPHILS NFR BLD AUTO: 51 %
NONHDLC SERPL-MCNC: 118 MG/DL (ref ?–130)
OSMOLALITY SERPL CALC.SUM OF ELEC: 288 MOSM/KG (ref 275–295)
PLATELET # BLD AUTO: 253 10(3)UL (ref 150–450)
POTASSIUM SERPL-SCNC: 3.9 MMOL/L (ref 3.5–5.1)
PROT SERPL-MCNC: 7.7 G/DL (ref 6.4–8.2)
RBC # BLD AUTO: 4.58 X10(6)UL
SODIUM SERPL-SCNC: 140 MMOL/L (ref 136–145)
TRIGL SERPL-MCNC: 47 MG/DL (ref 30–149)
TSI SER-ACNC: 2.29 MIU/ML (ref 0.36–3.74)
VLDLC SERPL CALC-MCNC: 8 MG/DL (ref 0–30)
WBC # BLD AUTO: 4.1 X10(3) UL (ref 4–11)

## 2024-02-03 PROCEDURE — 99395 PREV VISIT EST AGE 18-39: CPT | Performed by: INTERNAL MEDICINE

## 2024-02-03 PROCEDURE — 84443 ASSAY THYROID STIM HORMONE: CPT

## 2024-02-03 PROCEDURE — 85025 COMPLETE CBC W/AUTO DIFF WBC: CPT

## 2024-02-03 PROCEDURE — 80061 LIPID PANEL: CPT

## 2024-02-03 PROCEDURE — 36415 COLL VENOUS BLD VENIPUNCTURE: CPT

## 2024-02-03 PROCEDURE — 80053 COMPREHEN METABOLIC PANEL: CPT

## 2024-02-03 RX ORDER — TRIAMCINOLONE ACETONIDE 1 MG/G
CREAM TOPICAL 2 TIMES DAILY PRN
Qty: 45 G | Refills: 0 | Status: SHIPPED | OUTPATIENT
Start: 2024-02-03

## 2024-02-03 NOTE — PROGRESS NOTES
[facility] Internal Medicine Office Note  Chief Complaint:   Chief Complaint   Patient presents with    Physical     Annual Physical - hydrocortisone cream not working, would like to try something else        HPI:   This is a 38 year old female coming in for physical  HPI    She had abdominal pain 3 weeks ago that was significant.  Her period was very late and she took a preg test that was negative  Constant pain daily x2 weeks. Pain went away spontaneously and has not re-occurred     Throat discomfort with eating for the past 3 days  Described as burning  Denies rhinorrhea or cough and does not feel sick  Denies heartburn      She declines Tdap and flu shot     Chronic intermittent rash on her back for past years  Prescribed hydrocortisone 2% in Dec which helped some       Patient Active Problem List   Diagnosis    BMI 40.0-44.9, adult (HCC)    History of ovarian cyst    History of uterine fibroid     Past Surgical History:   Procedure Laterality Date           Family History   Problem Relation Age of Onset    Breast Cancer Sister 30    Cancer Sister         I reviewed her's Past Medical History, Past Surgical History, Family History and   Social History updated shows  Social History     Socioeconomic History    Marital status: Single   Tobacco Use    Smoking status: Never    Smokeless tobacco: Never   Vaping Use    Vaping Use: Never used   Substance and Sexual Activity    Alcohol use: Not Currently     Comment: 2-3 drinks / month    Drug use: Never    Sexual activity: Yes     Partners: Male     Birth control/protection: Condom     Comment: Sometimes (condoms)   Other Topics Concern    Caffeine Concern Yes    Exercise Yes    Seat Belt Yes    Special Diet No    Stress Concern No    Weight Concern Yes     Allergies:  No Known Allergies  Current Outpatient Medications   Medication Sig Dispense Refill    triamcinolone 0.1 % External Cream Apply topically 2 (two) times daily as needed. 45 g 0     benadryl-lidocaine-mylanta 1:1:1 Oral Suspension Take 5-10 mL by mouth TID AC&HS. Swish and Smallow or Swish and Spit 210 mL 0    hydrocortisone 2.5 % External Cream Apply 1 Application topically 2 (two) times daily. 20 g 0         REVIEW OF SYSTEMS:   Review of Systems   Constitutional:  Negative for fever.   HENT:  Negative for congestion.    Eyes:  Negative for visual disturbance.   Respiratory:  Negative for shortness of breath.    Cardiovascular:  Negative for chest pain.   Gastrointestinal:  Negative for constipation.   Genitourinary:  Negative for dysuria.   Neurological: Negative.    Hematological: Negative.    Psychiatric/Behavioral: Negative.          EXAM:   /82 (BP Location: Right arm, Patient Position: Sitting, Cuff Size: large)   Pulse 84   Temp 98.1 °F (36.7 °C) (Temporal)   Resp 16   Ht 5' 6\" (1.676 m)   Wt 212 lb 6.4 oz (96.3 kg)   LMP 01/10/2024 (Approximate)   SpO2 97%   BMI 34.28 kg/m²  Estimated body mass index is 34.28 kg/m² as calculated from the following:    Height as of this encounter: 5' 6\" (1.676 m).    Weight as of this encounter: 212 lb 6.4 oz (96.3 kg).   Vital signs reviewed. Appears stated age, well groomed.  Physical Exam  Vitals reviewed.   Constitutional:       General: She is not in acute distress.     Appearance: She is well-developed.   HENT:      Head: Normocephalic and atraumatic.      Right Ear: Tympanic membrane normal.      Left Ear: Tympanic membrane normal.   Eyes:      Conjunctiva/sclera: Conjunctivae normal.   Cardiovascular:      Rate and Rhythm: Normal rate and regular rhythm.      Heart sounds: Normal heart sounds.   Pulmonary:      Effort: Pulmonary effort is normal.      Breath sounds: Normal breath sounds.   Abdominal:      Palpations: Abdomen is soft.      Tenderness: There is no abdominal tenderness.   Musculoskeletal:      Cervical back: Neck supple.      Right lower leg: No edema.      Left lower leg: No edema.   Lymphadenopathy:      Cervical:  No cervical adenopathy.   Skin:     General: Skin is warm and dry.   Neurological:      General: No focal deficit present.      Mental Status: She is alert.   Psychiatric:         Mood and Affect: Mood normal.          ASSESSMENT AND PLAN:   Kat Wolf is a 38 year old female with  1. Encounter for routine adult medical examination    2. Laboratory exam ordered as part of routine general medical examination    3. Rash    4. Aphthous ulcer          The plan is as follows  Kat was seen today for physical.    Diagnoses and all orders for this visit:       Encounter for routine adult medical examination  -pap due 2/2026  -yearly flu shot recommended  -Tdap recommended     Laboratory exam ordered as part of routine general medical examination  -     Lipid Panel; Future  -     TSH W Reflex To Free T4; Future  -     Comp Metabolic Panel (14); Future  -     CBC With Differential With Platelet; Future    Rash  Use steroid cream for 7-10 days and then do not use for 2 weeks. Ok to resume for an additional 7 days if needed  Change to All free and clear detergent   Call to schedule appointment with dermatology. Let us know if she is not in network and you need another referral. Please call insurance who is in network.  -     Derm Referral - External    Aphthous ulcer -prescribed magic mouthwash; faxed prescription     Other orders  -     triamcinolone 0.1 % External Cream; Apply topically 2 (two) times daily as needed.  -     benadryl-lidocaine-mylanta 1:1:1 Oral Suspension; Take 5-10 mL by mouth TID AC&HS. Swish and Smallow or Swish and Spit        Orders Placed This Encounter   Procedures    Lipid Panel    TSH W Reflex To Free T4    Comp Metabolic Panel (14)    CBC With Differential With Platelet       Meds & Refills for this Visit:  Requested Prescriptions     Signed Prescriptions Disp Refills    triamcinolone 0.1 % External Cream 45 g 0     Sig: Apply topically 2 (two) times daily as needed.     benadryl-lidocaine-mylanta 1:1:1 Oral Suspension 210 mL 0     Sig: Take 5-10 mL by mouth TID AC&HS. Swish and Smallow or Swish and Spit       Imaging & Consults:  DERM - EXTERNAL    Health Maintenance Due   Topic Date Due    DTaP,Tdap,and Td Vaccines (5 - Tdap) 07/13/1996    COVID-19 Vaccine (3 - 2023-24 season) 09/01/2023    Annual Physical  09/24/2023    Influenza Vaccine (1) Never done    Annual Depression Screening  01/01/2024     Patient/Caregiver Education: Patient/Caregiver Education: There are no barriers to learning. Medical education done. Outcome: Patient verbalizes understanding. Patient is notified to call with any questions, complications, allergies, or worsening or changing symptoms.  Patient is to call with any side effects or complications from the treatments as a result of today.     Lori Palacios MD

## 2024-02-03 NOTE — PATIENT INSTRUCTIONS
Use steroid cream for 7-10 days and then do not use for 2 weeks. Ok to resume for an additional 7 days if needed    Call to schedule appointment with dermatology. Let us know if she is not in network and you need another referral. Please call insurance who is in network.    Blood work     Call to schedule appointment with gynecology     All Free and Clear detergent

## 2024-03-22 ENCOUNTER — HOSPITAL ENCOUNTER (OUTPATIENT)
Age: 39
Discharge: HOME | End: 2024-03-22
Payer: MEDICAID

## 2024-03-22 VITALS
DIASTOLIC BLOOD PRESSURE: 83 MMHG | SYSTOLIC BLOOD PRESSURE: 132 MMHG | WEIGHT: 212 LBS | BODY MASS INDEX: 33.27 KG/M2 | HEART RATE: 80 BPM | RESPIRATION RATE: 16 BRPM | OXYGEN SATURATION: 100 % | HEIGHT: 67 IN | TEMPERATURE: 99 F

## 2024-03-22 DIAGNOSIS — N39.0 URINARY TRACT INFECTION WITHOUT HEMATURIA, SITE UNSPECIFIED: ICD-10-CM

## 2024-03-22 DIAGNOSIS — N76.0 BACTERIAL VAGINOSIS: Primary | ICD-10-CM

## 2024-03-22 DIAGNOSIS — B96.89 BACTERIAL VAGINOSIS: Primary | ICD-10-CM

## 2024-03-22 LAB
B-HCG UR QL: NEGATIVE
BILIRUB UR QL STRIP: NEGATIVE
CLARITY UR: CLEAR
COLOR UR: YELLOW
GLUCOSE UR STRIP-MCNC: NEGATIVE MG/DL
HGB UR QL STRIP: NEGATIVE
KETONES UR STRIP-MCNC: NEGATIVE MG/DL
NITRITE UR QL STRIP: NEGATIVE
PH UR STRIP: 7 [PH]
PROT UR STRIP-MCNC: NEGATIVE MG/DL
SP GR UR STRIP: 1.02
UROBILINOGEN UR STRIP-ACNC: <2 MG/DL

## 2024-03-22 PROCEDURE — 99214 OFFICE O/P EST MOD 30 MIN: CPT

## 2024-03-22 PROCEDURE — 99459 PELVIC EXAMINATION: CPT

## 2024-03-22 PROCEDURE — 87086 URINE CULTURE/COLONY COUNT: CPT | Performed by: NURSE PRACTITIONER

## 2024-03-22 PROCEDURE — 81025 URINE PREGNANCY TEST: CPT

## 2024-03-22 PROCEDURE — 87591 N.GONORRHOEAE DNA AMP PROB: CPT | Performed by: NURSE PRACTITIONER

## 2024-03-22 PROCEDURE — 87491 CHLMYD TRACH DNA AMP PROBE: CPT | Performed by: NURSE PRACTITIONER

## 2024-03-22 PROCEDURE — 81002 URINALYSIS NONAUTO W/O SCOPE: CPT

## 2024-03-22 PROCEDURE — 81514 NFCT DS BV&VAGINITIS DNA ALG: CPT | Performed by: NURSE PRACTITIONER

## 2024-03-22 RX ORDER — CEPHALEXIN 500 MG/1
500 CAPSULE ORAL 2 TIMES DAILY
Qty: 14 CAPSULE | Refills: 0 | Status: SHIPPED | OUTPATIENT
Start: 2024-03-22 | End: 2024-03-29

## 2024-03-22 RX ORDER — METRONIDAZOLE 500 MG/1
500 TABLET ORAL 2 TIMES DAILY
Qty: 14 TABLET | Refills: 0 | Status: SHIPPED | OUTPATIENT
Start: 2024-03-22 | End: 2024-03-29

## 2024-03-22 NOTE — ED PROVIDER NOTES
Patient Seen in: [facility]    History     Chief Complaint   Patient presents with    Abdomen/Flank Pain    Urinary Symptoms    Eval-G     Stated Complaint: abdominal pain, urinary issue    HPI    Patient is a 38-year-old female who is here today with complaints of suprapubic cramping, dysuria and vaginal discharge that started 2 days ago.  Patient reports that she is sexually active with multiple partners.  One of her partners has been sexually active with other multiple partners.  Patient complains of suprapubic cramping as well.  Patient denies being dizzy, light headed or short of breath.  No fever, or chills, no urinary frequency or urgency.  No nausea or vomiting.        Past Medical History:   Diagnosis Date    Allergic rhinitis     Anxiety     Obesity        Past Surgical History:   Procedure Laterality Date                  Family History   Problem Relation Age of Onset    Breast Cancer Sister 30    Cancer Sister        Social History     Socioeconomic History    Marital status: Single   Tobacco Use    Smoking status: Never    Smokeless tobacco: Never   Vaping Use    Vaping Use: Never used   Substance and Sexual Activity    Alcohol use: Not Currently     Comment: 2-3 drinks / month    Drug use: Never    Sexual activity: Yes     Partners: Male     Birth control/protection: Condom     Comment: Sometimes (condoms)   Other Topics Concern    Caffeine Concern Yes    Exercise Yes    Seat Belt Yes    Special Diet No    Stress Concern No    Weight Concern Yes       Review of Systems    Positive for stated complaint: abdominal pain, urinary issue  Other systems are as noted in HPI.  Constitutional and vital signs reviewed.      All other systems reviewed and negative except as noted above.    PSFH elements reviewed from today and agreed except as otherwise stated in HPI.    Physical Exam     ED Triage Vitals [24 1317]   /83   Pulse 80   Resp 16   Temp 98.7 °F (37.1 °C)   Temp src     SpO2 100 %   O2 Device None (Room air)       Current:/83   Pulse 80   Temp 98.7 °F (37.1 °C)   Resp 16   Ht 170.2 cm (5' 7\")   Wt 96.2 kg   LMP 03/03/2024 (Approximate)   SpO2 100%   Breastfeeding No   BMI 33.20 kg/m²     Female  physical exam:     VS: Vital signs reviewed. O2 saturation within normal limits for this patient     General: Patient is awake and alert, oriented to person, place and time. Not in acute distress.      HEENT: Head is normocephalic atraumatic. Posterior oropharynx does not reveal any erythema, edema or exudates.  No oral vesicles. Mucous membranes moist.      Neck: No cervical lymphadenopathy.      Heart: S1-S2.  Regular rate and rhythm.       Lungs: good inspiratory effort. +air entry bilaterally without wheezes, rhonchi, crackles.  No accessory muscle use or tachypnea.       Abdomen: Soft, nontender, nondistended.  Active bowel sounds present.       Back: No CVA tenderness.     : Chaperone present, RN present. External genital exam does not reveal any rashes or lesions.  Speculum exam performed.  Cervix is visualized without apparent abnormality.  There is copious thin, watery, white discharge noted in the vaginal vault.      Extremities: No edema.  Pulses 2+ extremities.       Skin: No rash noted.  No ecchymosis.       CNS: Moves all 4 extremities.  Interacts appropriately.             ED Course     Labs Reviewed   [facility] POCT URINALYSIS DIPSTICK - Abnormal; Notable for the following components:       Result Value    Leukocyte esterase urine Small (*)     All other components within normal limits   POCT PREGNANCY URINE - Normal   CHLAMYDIA/GONOCOCCUS, LACY   VAGINITIS VAGINOSIS PCR PANEL   URINE CULTURE, ROUTINE           I have personally  reviewed available prior medical records for any recent pertinent discharge summaries/testing. Patient/family updated on results and plan, a verbalized understanding and agreement with the plan.  I explained to the patient that  emergent conditions may arise and to go to the ER for new, worsening or any persistent conditions. I've explained the importance of taking all medicatons as prescribed, follow up, and return precuations,  All questions answered.  MDM       Patient presents with abdominal pain, nausea and emesis. Patient is afebrile, does not appear toxic and does not meet SIRS criteria.  Patient does not have tenderness over Mcburney's point, rebound tenderness or guarding. Patient does not appear clinically dehydrated and is able to tolerate po. Encouraged patient on oral hydration. UA does not reveal evidence of UTI. Pregnancy test is negative. + thin watery white  vaginal discharge no  suprapubic tenderness.  Will treat for BV.  Also treated for UTI      Disposition and Plan     Clinical Impression:  1. Bacterial vaginosis    2. Urinary tract infection without hematuria, site unspecified        Disposition:  Discharge    Follow-up:  Lori Palacios MD  [address] [place] [id number] [phone]            Medications Prescribed:  Discharge Medication List as of 3/22/2024  2:02 PM

## 2024-03-23 LAB
BV BACTERIA DNA VAG QL NAA+PROBE: POSITIVE
C GLABRATA DNA VAG QL NAA+PROBE: NEGATIVE
C KRUSEI DNA VAG QL NAA+PROBE: NEGATIVE
CANDIDA DNA VAG QL NAA+PROBE: NEGATIVE
T VAGINALIS DNA VAG QL NAA+PROBE: NEGATIVE

## 2024-03-25 ENCOUNTER — PATIENT MESSAGE (OUTPATIENT)
Dept: INTERNAL MEDICINE CLINIC | Facility: CLINIC | Age: 39
End: 2024-03-25

## 2024-03-25 LAB
C TRACH DNA SPEC QL NAA+PROBE: NEGATIVE
N GONORRHOEA DNA SPEC QL NAA+PROBE: NEGATIVE

## 2024-03-26 NOTE — TELEPHONE ENCOUNTER
From: Kat Wolf  To: Lori Palacios  Sent: 3/25/2024 10:28 AM CDT  Subject: Test results     I came in to urgent care over the weekend. I want to know if all my results are in? The ones I received were negative but just wondering if I am still waiting on some results to come back?

## 2024-03-26 NOTE — TELEPHONE ENCOUNTER
LS- Please advise   -believe all labs results reviewed with patient except urine culture?  Can we share UC results with patient?     3/22/24 UC   Disposition and Plan      Clinical Impression:  1. Bacterial vaginosis    2. Urinary tract infection without hematuria, site unspecified

## 2024-04-25 ENCOUNTER — TELEPHONE (OUTPATIENT)
Dept: INTERNAL MEDICINE CLINIC | Facility: CLINIC | Age: 39
End: 2024-04-25

## 2024-04-26 NOTE — TELEPHONE ENCOUNTER
Spoke to pt, she spoke to the [facility] this morning and has decided to cancel her request. No need to complete the forms.

## 2024-04-26 NOTE — TELEPHONE ENCOUNTER
Please ask patient what these forms are in regards to? It is a very extensive packet with a required physical exam. Please schedule appointment - respiratory slot is ok

## 2024-05-12 ENCOUNTER — PATIENT MESSAGE (OUTPATIENT)
Dept: INTERNAL MEDICINE CLINIC | Facility: CLINIC | Age: 39
End: 2024-05-12

## 2024-05-13 NOTE — TELEPHONE ENCOUNTER
From: Kat Wolf  To: Lori Palacios  Sent: 5/12/2024 7:29 PM CDT  Subject: Prescription     I wanted to know if I have to come in for another appointment to get the prescription for the weight loss shot you gave me last year? Or can I refill the prescription from [facility]?

## 2024-05-13 NOTE — TELEPHONE ENCOUNTER
Please advise.  Thank you.   Patient is inquiring about weight loss medication.    Patient was previously prescribed saxenda - 2/20/23 3ml with ) refills.    Last office visit: 2/3/24    Would you recommend office visit?

## 2024-05-15 NOTE — TELEPHONE ENCOUNTER
Apt scheduled.     Future Appointments   Date Time Provider Department Center   6/10/2024 10:30 AM Lori Palacios MD [facility] [place]

## 2024-05-15 NOTE — TELEPHONE ENCOUNTER
Please reach out to schedule appt; 15 min or 30 min ok. Video or in person appt ok. Have her check her weight at home if doing video visit appt

## 2024-06-24 RX ORDER — TRIAMCINOLONE ACETONIDE 1 MG/G
CREAM TOPICAL 2 TIMES DAILY PRN
Qty: 45 G | Refills: 0 | Status: SHIPPED | OUTPATIENT
Start: 2024-06-24

## 2024-06-24 NOTE — TELEPHONE ENCOUNTER
Triamcinolone 0.1%    LOV: 2/3/24  RTC: 1 yr   Filled: 2/3/24 # 45g   Labs: 2/3/24   No future appointments.

## 2024-09-09 ENCOUNTER — PATIENT MESSAGE (OUTPATIENT)
Dept: OBGYN CLINIC | Facility: CLINIC | Age: 39
End: 2024-09-09

## 2024-09-09 NOTE — TELEPHONE ENCOUNTER
Called to follow up with patient.   Was in the ER on  after  on . Experiencing severe abdominal pain and vaginal bleeding. US showing retained products, had scheduled D&C with clinic that performed , but feels more comfortable proceeding with our office.     As of today, still experiencing severe pain. Wondering if she can get D&C scheduled for this week.     Routed to MD.

## 2024-09-09 NOTE — TELEPHONE ENCOUNTER
From: Kat Wolf  To: Sophie Grant  Sent: 2024 9:15 AM CDT  Subject: D&C    Good morning I recently had an  on  and some particles from the pregnancy are still there. Went into the emergency room on  and told me to schedule a D&C ASAP with my doctor. Can I get some referrals to call to get this appointment scheduled if possible.

## 2024-09-10 NOTE — TELEPHONE ENCOUNTER
Called to follow up with patient.     Informed patient to maintain and go to appointment scheduled with [facility] today, as they have access to the US and may be able to get her scheduled for this week.     If not patient is to call office back and provide US summary prior to scheduling. Verbalized understanding.     All questions answered.

## 2024-09-25 ENCOUNTER — HOSPITAL ENCOUNTER (OUTPATIENT)
Age: 39
Discharge: HOME | End: 2024-09-25
Payer: MEDICAID

## 2024-09-25 VITALS
BODY MASS INDEX: 33.74 KG/M2 | DIASTOLIC BLOOD PRESSURE: 77 MMHG | HEIGHT: 67 IN | TEMPERATURE: 99 F | SYSTOLIC BLOOD PRESSURE: 128 MMHG | OXYGEN SATURATION: 98 % | RESPIRATION RATE: 18 BRPM | WEIGHT: 215 LBS | HEART RATE: 93 BPM

## 2024-09-25 DIAGNOSIS — N93.9 VAGINAL BLEEDING: Primary | ICD-10-CM

## 2024-09-25 PROCEDURE — 99212 OFFICE O/P EST SF 10 MIN: CPT

## 2024-09-25 PROCEDURE — 99214 OFFICE O/P EST MOD 30 MIN: CPT

## 2024-09-25 NOTE — ED PROVIDER NOTES
Patient Seen in: [facility]      History   No chief complaint on file.    Stated Complaint: Menstraul issue    Subjective:   HPI  39-year-old female presents complaining of heavy menstrual bleeding with large clots the size of limes feeling like she has her menstrual period.  She is on birth control.  She had a  on  at 5 weeks.  There was retrained products of conception and then she had a suppository medication applied.  Her last quant hCG on September 10 was still in the upper 300s.  She is following up with an OB at [facility].  She has a follow-up appointment with an OB at [facility] in November.    Objective:   Past Medical History:    Allergic rhinitis    Anxiety    Obesity              Past Surgical History:   Procedure Laterality Date                      Social History     Socioeconomic History    Marital status: Single   Tobacco Use    Smoking status: Never    Smokeless tobacco: Never   Vaping Use    Vaping status: Never Used   Substance and Sexual Activity    Alcohol use: Not Currently     Comment: 2-3 drinks / month    Drug use: Never    Sexual activity: Yes     Partners: Male     Birth control/protection: Condom     Comment: Sometimes (condoms)   Other Topics Concern    Caffeine Concern Yes    Exercise Yes    Seat Belt Yes    Special Diet No    Stress Concern No    Weight Concern Yes     Social Determinants of Health      [facility] [employer] [employer]    [facility]              Review of Systems   All other systems reviewed and are negative.      Positive for stated Chief Complaint: No chief complaint on file.    Other systems are as noted in HPI.  Constitutional and vital signs reviewed.      All other systems reviewed and negative except as noted above.    Physical Exam     ED Triage Vitals [24 1137]   /77   Pulse 93   Resp 18   Temp 98.7 °F (37.1 °C)   Temp src Oral   SpO2 98 %   O2 Device None (Room air)       Current  Vitals:   Vital Signs  BP: 128/77  Pulse: 93  Resp: 18  Temp: 98.7 °F (37.1 °C)  Temp src: Oral    Oxygen Therapy  SpO2: 98 %  O2 Device: None (Room air)            Physical Exam  Vitals and nursing note reviewed.   Constitutional:       General: She is not in acute distress.     Appearance: She is well-developed. She is not ill-appearing or toxic-appearing.   Cardiovascular:      Rate and Rhythm: Normal rate.   Pulmonary:      Effort: Pulmonary effort is normal.   Skin:     General: Skin is warm and dry.   Neurological:      Mental Status: She is alert and oriented to person, place, and time.               ED Course   Labs Reviewed - No data to display                   MDM     Medical Decision Making  39-year-old female presents complaining of heavy menstrual bleeding with large clots the size of limes feeling like she has her menstrual period.  She is on birth control.  She had a  on  at 5 weeks.  There was retrained products of conception and then she had a suppository medication applied.  Her last quant hCG on September 10 was still in the upper 300s.  She is following up with an OB at [facility].  She has a follow-up appointment with an OB at [facility] in November.    Patient coming in with vaginal bleeding.   Differential diagnosis includes but not limited to vaginal bleeding, retained products of conception, miscarriage  Will treat for vaginal bleeding.  Will discharge on none. Patient/Parent is comfortable with this plan.    Vital signs are stable.  No tachycardia.  I spoke with Dr. Edelmira Tucker at [facility] who states that there is a lab order for a quantitative hCG which patient will go get drawn.  She states the results for this come back in approximately 2 to 3 hours.  I explained to patient if there is still elevation of her quantitative hCG that she is supposed to check into the emergency room.  Patient verbalized understanding and agreed with plan of  care.        Problems Addressed:  Vaginal bleeding: acute illness or injury        Disposition and Plan     Clinical Impression:  1. Vaginal bleeding         Disposition:  Discharge  9/25/2024 12:19 pm    Follow-up:  No follow-up provider specified.        Medications Prescribed:  Discharge Medication List as of 9/25/2024 12:24 PM

## 2024-09-25 NOTE — DISCHARGE INSTRUCTIONS
Go to [address] for your quant hcg level to be drawn. I spoke with Violet.  If your hcg is still positive go to ER.  Follow up with OB.

## 2024-09-25 NOTE — ED INITIAL ASSESSMENT (HPI)
Pt had an  on  being 5 weeks pregnant, had a 2 week check up with Us for pain and increased bleeding, pt retaining products of conception, saw Obgyn,Hcg going down no D&C needed, period started  Saturday but passing lime size clots with mild cramping, pt wanting to make sure everything is ok

## 2024-10-07 ENCOUNTER — OFFICE VISIT (OUTPATIENT)
Dept: OBGYN CLINIC | Facility: CLINIC | Age: 39
End: 2024-10-07
Payer: MEDICAID

## 2024-10-07 VITALS
BODY MASS INDEX: 34.93 KG/M2 | SYSTOLIC BLOOD PRESSURE: 124 MMHG | DIASTOLIC BLOOD PRESSURE: 70 MMHG | WEIGHT: 217.38 LBS | HEART RATE: 92 BPM | HEIGHT: 66 IN

## 2024-10-07 DIAGNOSIS — N93.9 ABNORMAL UTERINE BLEEDING (AUB): Primary | ICD-10-CM

## 2024-10-07 DIAGNOSIS — O07.4: ICD-10-CM

## 2024-10-07 PROCEDURE — 99213 OFFICE O/P EST LOW 20 MIN: CPT | Performed by: NURSE PRACTITIONER

## 2024-10-07 NOTE — PROGRESS NOTES
Gyne note       S: patient is a 39 year old yo  here to discuss heavy and abnormal bleeding she had with her menses.    She started bleeding -10/2 which she thought was a menses. At the time she was still being monitored for retained tissue after a pregnancy termination.    On  she had a surgical termination and was also given 1 pill to take the same day.     She went to her 2 week post op check on 2024 and was having increased bleeding, breast pain, and cramps. On ultrasound there was still tissue noted and they recommended a D&C or more medication. She didn't feel comfortable going there and was recommended to see gynecology.    She went to the ER the same day and had HCG and US done. They saw the same findings on ultrasound and her HCG was 872.    She passed a large clot with tissue 2024 without medication and then felt better assuming she passed the bulk the tissue on her own. She then followed up with their recommended gynecologist on 9/10/2024 and he recommended following her HCG to 0.    Her bleeding stopped shortly afterward and her HCG was 386 on 2024 and 88 on .     She was just concerned about the above bleeding starting on 2024.    Review of Systems:  General: denies fevers, chills, fatigue and malaise.     O:/70   Pulse 92   Ht 66\"   Wt 217 lb 6 oz (98.6 kg)   LMP 2024 (Approximate)   BMI 35.09 kg/m²   Gen NAD     GYNE/: External Genitalia: Normal appearing, no lesions. Urethral meatus appear wnl, no abnormal discharge or lesions noted.                                Vagina: normal pink mucosa, no lesions, normal clear discharge.                      Uterus: AV, mobile, non tender, normal size                     Cervix: parous, no lesions                     Adnexa: non tender, no masses, normal size             A/P:  1. Abnormal uterine bleeding (AUB)  - CBC W Differential W Platelet; Future    2. Incomplete induced termination of  pregnancy  Will await HCG results but advised this was not a menses but a result of the HCG levels coming down and tissue passing.    She would like permanent sterilization. I advised she can scheduled a consult with an MD to discuss.    Expect a normal menses within a few weeks once she has a negative HCG and no further bleeding.

## 2024-10-12 ENCOUNTER — PATIENT MESSAGE (OUTPATIENT)
Dept: OBGYN CLINIC | Facility: CLINIC | Age: 39
End: 2024-10-12

## 2024-10-14 NOTE — TELEPHONE ENCOUNTER
With it still decreasing I don't feel a D&C would be warranted, more repeating the levels in 1-2 weeks.     If she has concerns with infection like symptoms, pain, or abnormal bleeding she could follow up acutely with one of our physicians to discuss a plan of care.

## 2024-10-18 ENCOUNTER — TELEMEDICINE (OUTPATIENT)
Dept: INTERNAL MEDICINE CLINIC | Facility: CLINIC | Age: 39
End: 2024-10-18
Payer: MEDICAID

## 2024-10-18 DIAGNOSIS — R10.9 ABDOMINAL DISCOMFORT: Primary | ICD-10-CM

## 2024-10-18 DIAGNOSIS — R14.0 ABDOMINAL BLOATING: ICD-10-CM

## 2024-10-18 DIAGNOSIS — K59.00 CONSTIPATION, UNSPECIFIED CONSTIPATION TYPE: ICD-10-CM

## 2024-10-18 PROCEDURE — 99213 OFFICE O/P EST LOW 20 MIN: CPT | Performed by: INTERNAL MEDICINE

## 2024-10-18 NOTE — PROGRESS NOTES
This is a telemedicine visit with live, interactive video and audio.     Patient understands and accepts financial responsibility for any deductible, co-insurance and/or co-pays associated with this service.    SUBJECTIVE  She has been having intermittent left sided abdominal discomfort in the mid-abd for past 5 days. She notes symptoms come on after eating. She thinks she may be constipated. Last bowel movement was yesterday and was small hard stool. +gas +bloating    She recently had a pregnancy termination and is following with gynecology. HCG levels are trending downward     HISTORY:  Past Medical History:    Allergic rhinitis    Anxiety    Obesity      Past Surgical History:   Procedure Laterality Date            Family History   Problem Relation Age of Onset    Breast Cancer Sister 30    Cancer Sister       Social History     Socioeconomic History    Marital status: Single   Tobacco Use    Smoking status: Never    Smokeless tobacco: Never   Vaping Use    Vaping status: Never Used   Substance and Sexual Activity    Alcohol use: Not Currently     Comment: 2-3 drinks / month    Drug use: Never    Sexual activity: Yes     Partners: Male     Birth control/protection: Condom     Comment: Sometimes (condoms)   Other Topics Concern    Caffeine Concern Yes    Exercise Yes    Seat Belt Yes    Special Diet No    Stress Concern No    Weight Concern Yes     Social Drivers of Health      [facility] from [facility]    [facility]        Allergies[1]   Current Outpatient Medications   Medication Sig Dispense Refill    triamcinolone 0.1 % External Cream Apply topically 2 (two) times daily as needed. 45 g 0       OBJECTIVE  Physical Exam:   No increased work of breathing. Alert and oriented. In no acute distress.    ASSESSMENT & PLAN  Diagnoses and all orders for this visit:    Abdominal discomfort and     Constipation, unspecified constipation type and     Abdominal bloating     -symptoms likely related to  constipation.  Start miralax 1/2-1 capful daily for constipation. You can increase the dose as needed (such as 1 capful twice daily etc.)    Increase water with goal of 10 glasses/day    Start probiotic. If you are able to find a refrigerated one, that is recommended such as Florajen - ask the pharmacist, or at [employer] grocery store. Other ones are available in the over the counter medication section including Align and Culturelle    Can try simethicone/Gas-X as needed for gas/bloating    Let me know if symptoms persist or worsen. If acutely worsen, follow up ER.       Lori Palacios MD             [1] No Known Allergies

## 2024-10-18 NOTE — PATIENT INSTRUCTIONS
Start miralax 1/2-1 capful daily for constipation. You can increase the dose as needed (such as 1 capful twice daily etc.)    Increase water with goal of 10 glasses/day    Start probiotic. If you are able to find a refrigerated one, that is recommended such as Florajen - ask the pharmacist, or at [employer] grocery store. Other ones are available in the over the counter medication section including Align and Culturelle    Can try simethicone/Gas-X as needed for gas/bloating

## 2024-11-15 RX ORDER — TRIAMCINOLONE ACETONIDE 1 MG/G
CREAM TOPICAL 2 TIMES DAILY PRN
Qty: 45 G | Refills: 0 | Status: SHIPPED | OUTPATIENT
Start: 2024-11-15

## 2025-04-29 RX ORDER — TRIAMCINOLONE ACETONIDE 1 MG/G
1 CREAM TOPICAL 2 TIMES DAILY PRN
Qty: 45 G | Refills: 0 | Status: SHIPPED | OUTPATIENT
Start: 2025-04-29

## (undated) NOTE — LETTER
08/26/21        oSlo Vital  [id number] [address] [id number]      Dear Mariposa Simental,    [address] [facility] records indicate that you have outstanding lab work and or testing that was ordered for you and has not yet been completed:  Orders Placed This Encounter